# Patient Record
Sex: MALE | Race: WHITE | NOT HISPANIC OR LATINO | Employment: STUDENT | URBAN - METROPOLITAN AREA
[De-identification: names, ages, dates, MRNs, and addresses within clinical notes are randomized per-mention and may not be internally consistent; named-entity substitution may affect disease eponyms.]

---

## 2017-12-29 PROBLEM — F90.2 ATTENTION DEFICIT HYPERACTIVITY DISORDER (ADHD), COMBINED TYPE: Status: ACTIVE | Noted: 2017-12-29

## 2023-07-17 ENCOUNTER — OFFICE VISIT (OUTPATIENT)
Dept: URGENT CARE | Facility: CLINIC | Age: 28
End: 2023-07-17
Payer: COMMERCIAL

## 2023-07-17 VITALS
OXYGEN SATURATION: 99 % | TEMPERATURE: 99 F | HEIGHT: 67 IN | SYSTOLIC BLOOD PRESSURE: 122 MMHG | DIASTOLIC BLOOD PRESSURE: 75 MMHG | HEART RATE: 71 BPM | WEIGHT: 170 LBS | BODY MASS INDEX: 26.68 KG/M2 | RESPIRATION RATE: 16 BRPM

## 2023-07-17 DIAGNOSIS — R11.2 NAUSEA VOMITING AND DIARRHEA: Primary | ICD-10-CM

## 2023-07-17 DIAGNOSIS — R19.7 NAUSEA VOMITING AND DIARRHEA: Primary | ICD-10-CM

## 2023-07-17 PROCEDURE — 99213 PR OFFICE/OUTPT VISIT, EST, LEVL III, 20-29 MIN: ICD-10-PCS | Mod: S$GLB,,, | Performed by: NURSE PRACTITIONER

## 2023-07-17 PROCEDURE — 99213 OFFICE O/P EST LOW 20 MIN: CPT | Mod: S$GLB,,, | Performed by: NURSE PRACTITIONER

## 2023-07-17 RX ORDER — ONDANSETRON 4 MG/1
4 TABLET, ORALLY DISINTEGRATING ORAL EVERY 8 HOURS PRN
Qty: 20 TABLET | Refills: 0 | Status: SHIPPED | OUTPATIENT
Start: 2023-07-17 | End: 2023-10-26

## 2023-07-17 NOTE — PROGRESS NOTES
"Subjective:      Patient ID: Jose Rafael Gavin is a 28 y.o. male.    Vitals:  height is 5' 7" (1.702 m) and weight is 77.1 kg (170 lb). His oral temperature is 98.6 °F (37 °C). His blood pressure is 122/75 and his pulse is 71. His respiration is 16 and oxygen saturation is 99%.     Chief Complaint: Emesis    Vomiting and diarrhea that began yesterday.  Patient has taken tums, with no relief.     Emesis   This is a new problem. The current episode started yesterday. Associated symptoms include diarrhea. Treatments tried: tums.     Gastrointestinal:  Positive for vomiting and diarrhea.    Objective:     Physical Exam   Cardiovascular: Normal heart sounds and normal pulses.   Pulmonary/Chest: Effort normal and breath sounds normal.   Abdominal: Normal appearance. He exhibits no distension and no mass. Soft. flat abdomen There is no abdominal tenderness. There is no rebound, no guarding, no left CVA tenderness and no right CVA tenderness. No hernia.   Neurological: He is alert.   Vitals reviewed.    Assessment:     1. Nausea vomiting and diarrhea        Plan:       Nausea vomiting and diarrhea    Other orders  -     ondansetron (ZOFRAN-ODT) 4 MG TbDL; Take 1 tablet (4 mg total) by mouth every 8 (eight) hours as needed (nausea).  Dispense: 20 tablet; Refill: 0                  Suspect viral gastroenteritis.  Physical exam in [facility] without red flags.  Zofran as above.    Patient Instructions   Bennie Mantilla,     Hydration is most important. Drink water and electrolyte drinks. Avoid anything greasy or spicy for 1-2 weeks. Ok to take imodium for 3 loose stools in a 24 hour period. If you have fever, chills, body aches, cough, sore throat, or other symptoms you need to be seen again.    "

## 2023-07-17 NOTE — LETTER
July 17, 2023      [facility]  [address] [address] [address]       Patient: Jose Rafael Gavin   YOB: 1995  Date of Visit: 07/17/2023    To Whom It May Concern:    Jada Gavin  was at [facility] on 07/17/2023. The patient may return to work/school on 7/19/2023 with no restrictions. If you have any questions or concerns, or if I can be of further assistance, please do not hesitate to contact me.    Sincerely,    Kevin Wilkins, NP

## 2023-07-17 NOTE — PATIENT INSTRUCTIONS
Bennie Mantilla,     Hydration is most important. Drink water and electrolyte drinks. Avoid anything greasy or spicy for 1-2 weeks. Ok to take imodium for 3 loose stools in a 24 hour period. If you have fever, chills, body aches, cough, sore throat, or other symptoms you need to be seen again.

## 2023-08-17 ENCOUNTER — OFFICE VISIT (OUTPATIENT)
Dept: FAMILY MEDICINE | Facility: CLINIC | Age: 28
End: 2023-08-17
Payer: COMMERCIAL

## 2023-08-17 VITALS
HEIGHT: 67 IN | SYSTOLIC BLOOD PRESSURE: 124 MMHG | HEART RATE: 68 BPM | DIASTOLIC BLOOD PRESSURE: 82 MMHG | WEIGHT: 175.5 LBS | OXYGEN SATURATION: 98 % | TEMPERATURE: 98 F | BODY MASS INDEX: 27.55 KG/M2

## 2023-08-17 DIAGNOSIS — F90.2 ATTENTION DEFICIT HYPERACTIVITY DISORDER (ADHD), COMBINED TYPE: Primary | ICD-10-CM

## 2023-08-17 PROCEDURE — 99203 OFFICE O/P NEW LOW 30 MIN: CPT | Mod: S$GLB,,, | Performed by: FAMILY MEDICINE

## 2023-08-17 PROCEDURE — 99203 PR OFFICE/OUTPT VISIT, NEW, LEVL III, 30-44 MIN: ICD-10-PCS | Mod: S$GLB,,, | Performed by: FAMILY MEDICINE

## 2023-08-17 RX ORDER — DEXTROAMPHETAMINE SACCHARATE, AMPHETAMINE ASPARTATE, DEXTROAMPHETAMINE SULFATE AND AMPHETAMINE SULFATE 3.75; 3.75; 3.75; 3.75 MG/1; MG/1; MG/1; MG/1
TABLET ORAL
COMMUNITY
End: 2023-08-17 | Stop reason: SDUPTHER

## 2023-08-17 RX ORDER — DEXTROAMPHETAMINE SACCHARATE, AMPHETAMINE ASPARTATE, DEXTROAMPHETAMINE SULFATE AND AMPHETAMINE SULFATE 3.75; 3.75; 3.75; 3.75 MG/1; MG/1; MG/1; MG/1
15 TABLET ORAL 3 TIMES DAILY
Qty: 90 TABLET | Refills: 0 | Status: SHIPPED | OUTPATIENT
Start: 2023-09-16 | End: 2023-11-22 | Stop reason: SDUPTHER

## 2023-08-17 RX ORDER — DEXTROAMPHETAMINE SACCHARATE, AMPHETAMINE ASPARTATE, DEXTROAMPHETAMINE SULFATE AND AMPHETAMINE SULFATE 3.75; 3.75; 3.75; 3.75 MG/1; MG/1; MG/1; MG/1
TABLET ORAL
Qty: 90 TABLET | Refills: 0 | Status: SHIPPED | OUTPATIENT
Start: 2023-08-17 | End: 2023-10-23 | Stop reason: SDUPTHER

## 2023-08-17 RX ORDER — DEXTROAMPHETAMINE SACCHARATE, AMPHETAMINE ASPARTATE, DEXTROAMPHETAMINE SULFATE AND AMPHETAMINE SULFATE 3.75; 3.75; 3.75; 3.75 MG/1; MG/1; MG/1; MG/1
15 TABLET ORAL 3 TIMES DAILY
Qty: 90 TABLET | Refills: 0 | Status: SHIPPED | OUTPATIENT
Start: 2023-10-16 | End: 2023-11-22 | Stop reason: SDUPTHER

## 2023-08-20 NOTE — PROGRESS NOTES
Subjective:       Patient ID: Jose Rafael Gavin is a 28 y.o. male.    Chief Complaint: Medication Refill    Has not been in for couple of years.  Completed [place].  The went to [employer] at [employer].  Has been working [employer] now for 16 months..  However he is forgetful.  To resume his ADD medicine.  Or shift for Monday through week 2 days next.  Twelve.  Days.  Having little trouble staying top the symptoms.  Vascular chest palpitations.      Physical examination.  Vital signs noted.  No acute distress.  Chest clear.  Heart regular rate and.  Rhythm.        Objective:        Assessment:       1. Attention deficit hyperactivity disorder (ADHD), combined type        Plan:       Attention deficit hyperactivity disorder (ADHD), combined type  -     dextroamphetamine-amphetamine (ADDERALL) 15 mg tablet; TK 1 T PO TID  Dispense: 90 tablet; Refill: 0  -     dextroamphetamine-amphetamine (ADDERALL) 15 mg tablet; Take 1 tablet (15 mg total) by mouth 3 (three) times daily.  Dispense: 90 tablet; Refill: 0  -     dextroamphetamine-amphetamine (ADDERALL) 15 mg tablet; Take 1 tablet (15 mg total) by mouth 3 (three) times daily.  Dispense: 90 tablet; Refill: 0      Resume Adderall 15 mg t.i.d. 3 prescriptions for 90 each.  Follow-up here in 3 months.

## 2023-09-19 ENCOUNTER — TELEPHONE (OUTPATIENT)
Dept: FAMILY MEDICINE | Facility: CLINIC | Age: 28
End: 2023-09-19
Payer: COMMERCIAL

## 2023-09-19 NOTE — TELEPHONE ENCOUNTER
Told called [facility] to see in stock, he called back its not. Done     ----- Message from Anjel Bates sent at 9/19/2023  1:40 PM CDT -----  Type:  RX Refill Request    Who Called:  Patient  Refill or New Rx:  Refill  RX Name and Strength:  dextroamphetamine-amphetamine (ADDERALL) 15 mg tablet      How is the patient currently taking it? (ex. 1XDay):  1 tablet 3XDay  Is this a 30 day or 90 day RX:  30  Preferred Pharmacy with phone number:        [id number] - [place] - [address]  [address]  [place] [address]  Phone: [phone] Fax: [phone]    Local or Mail Order:  Local  Ordering Provider:  Hudson Lewis Call Back Number:  [phone]  Additional Information:

## 2023-09-24 ENCOUNTER — OFFICE VISIT (OUTPATIENT)
Dept: URGENT CARE | Facility: CLINIC | Age: 28
End: 2023-09-24
Payer: COMMERCIAL

## 2023-09-24 VITALS
HEART RATE: 87 BPM | TEMPERATURE: 98 F | HEIGHT: 67 IN | DIASTOLIC BLOOD PRESSURE: 88 MMHG | WEIGHT: 175 LBS | OXYGEN SATURATION: 97 % | BODY MASS INDEX: 27.47 KG/M2 | SYSTOLIC BLOOD PRESSURE: 134 MMHG | RESPIRATION RATE: 18 BRPM

## 2023-09-24 DIAGNOSIS — T14.8XXA WOUND INFECTION: Primary | ICD-10-CM

## 2023-09-24 DIAGNOSIS — L08.9 WOUND INFECTION: Primary | ICD-10-CM

## 2023-09-24 DIAGNOSIS — I89.1 LYMPHANGITIS: ICD-10-CM

## 2023-09-24 PROCEDURE — 99203 PR OFFICE/OUTPT VISIT, NEW, LEVL III, 30-44 MIN: ICD-10-PCS | Mod: S$GLB,,, | Performed by: NURSE PRACTITIONER

## 2023-09-24 PROCEDURE — 99203 OFFICE O/P NEW LOW 30 MIN: CPT | Mod: S$GLB,,, | Performed by: NURSE PRACTITIONER

## 2023-09-24 RX ORDER — MUPIROCIN 20 MG/G
OINTMENT TOPICAL 3 TIMES DAILY
Qty: 30 G | Refills: 1 | Status: SHIPPED | OUTPATIENT
Start: 2023-09-24 | End: 2023-10-26

## 2023-09-24 RX ORDER — CIPROFLOXACIN 750 MG/1
750 TABLET, FILM COATED ORAL EVERY 12 HOURS
Qty: 20 TABLET | Refills: 0 | Status: SHIPPED | OUTPATIENT
Start: 2023-09-24 | End: 2023-10-04

## 2023-09-24 NOTE — PROGRESS NOTES
"Subjective:      Patient ID: Jose Rafael Gavin is a 28 y.o. male.    Vitals:  height is 5' 7" (1.702 m) and weight is 79.4 kg (175 lb). His oral temperature is 98.2 °F (36.8 °C). His blood pressure is 134/88 and his pulse is 87. His respiration is 18 and oxygen saturation is 97%.     Chief Complaint: Arm Injury    Pt states that his symptoms started 2 days ago when he fell on a tool box and cut his left wrist/arm on the tool box. Patient states that his symptoms currently are swelling to the left wrist and redness possible infection, patient states that the pain level is a 5.      Last tetanus shot 08/2018.   Patient reports that the injury initially occurred Friday night.  However they went fishing yesterday and he did submerge that injured area under the brackish water with new symptoms emerging that include pyuria looking drainage, redness, pain and red streak going up      Constitution: Negative for chills and fever.   Gastrointestinal:  Negative for nausea, vomiting and diarrhea.   Musculoskeletal:  Positive for pain and trauma.   Skin:  Positive for wound, abrasion and erythema (Red streak from wound at wrist going up forearm).        Right inner wrist      Objective:     Physical Exam   Constitutional: He is oriented to person, place, and time.  Non-toxic appearance. He does not appear ill. No distress.   HENT:   Head: Normocephalic and atraumatic.   Nose: Nose normal.   Mouth/Throat: Mucous membranes are moist.   Eyes: Conjunctivae are normal.   Cardiovascular: Normal rate.   Pulmonary/Chest: Effort normal. No respiratory distress.   Abdominal: Normal appearance.   Musculoskeletal: Normal range of motion.         General: Signs of injury present. No swelling, tenderness or deformity. Normal range of motion.   Neurological: no focal deficit. He is alert and oriented to person, place, and time.   Skin: Skin is warm, dry and no rash. Capillary refill takes 2 to 3 seconds. erythema (Red streak from wound at wrist " going up forearm) and lesion No bruising         Comments: Mild erythema surrounding the skin abrasion right wrist with healthy granulation tissue visible however there is red streak ascending from wound up forearm near AC joint.  See attached photo   Psychiatric: His behavior is normal. Mood normal.   Nursing note and vitals reviewed.          Assessment:     1. Wound infection    2. Lymphangitis      Saltwater exposure    Plan:       Wound infection  -     ciprofloxacin HCl (CIPRO) 750 MG tablet; Take 1 tablet (750 mg total) by mouth every 12 (twelve) hours. for 10 days  Dispense: 20 tablet; Refill: 0  -     mupirocin (BACTROBAN) 2 % ointment; Apply topically 3 (three) times daily.  Dispense: 30 g; Refill: 1    Lymphangitis  -     ciprofloxacin HCl (CIPRO) 750 MG tablet; Take 1 tablet (750 mg total) by mouth every 12 (twelve) hours. for 10 days  Dispense: 20 tablet; Refill: 0

## 2023-09-24 NOTE — PATIENT INSTRUCTIONS
Clean the affected area at least twice a day with antibacterial soap and water.  Allowed to air dry well then apply the mupirocin ointment.    Start the ciprofloxacin as soon as received from the pharmacy and take twice a day for 10 days.    As we discussed any significant worsening of symptoms or onset of systemic symptoms such as fever/chills/vomiting/diarrhea please go to the emergency room promptly for further evaluation and treatment.

## 2023-10-23 DIAGNOSIS — F90.2 ATTENTION DEFICIT HYPERACTIVITY DISORDER (ADHD), COMBINED TYPE: ICD-10-CM

## 2023-10-23 RX ORDER — DEXTROAMPHETAMINE SACCHARATE, AMPHETAMINE ASPARTATE, DEXTROAMPHETAMINE SULFATE AND AMPHETAMINE SULFATE 3.75; 3.75; 3.75; 3.75 MG/1; MG/1; MG/1; MG/1
TABLET ORAL
Qty: 90 TABLET | Refills: 0 | Status: SHIPPED | OUTPATIENT
Start: 2023-10-23 | End: 2023-11-22 | Stop reason: SDUPTHER

## 2023-10-23 NOTE — TELEPHONE ENCOUNTER
No care due was identified.  [facility] Embedded Care Due Messages. Reference number: [id number].   10/23/2023 1:43:14 PM CDT

## 2023-10-23 NOTE — TELEPHONE ENCOUNTER
----- Message from Cindy Gutierrez sent at 10/23/2023  4:57 PM CDT -----  Regarding: Sooner Appointment Request  Contact: YSABEL GARCIA [phone]  Type:  Sooner Appointment Request      Patient is requesting a sooner appointment.  Patient declined first available appointment listed below.  Patient will not accept being placed on the waitlist and is requesting a message be sent to doctor.      Name of Caller:  YSABEL GARCIA    When is the first available appointment?  11-24-23    Symptoms:  MILD HERNIA,  (R) SIDE PAIN,  & SWELLING OF THE TESTICLES     Best Call Back Number:  [phone]      Additional Information:  Patient is calling office to speak with nurse/MA to schedule sooner appointment. Complains of (R) side pain and swelling of the testicles.   Please call back and advise. Thanks

## 2023-10-26 ENCOUNTER — LAB VISIT (OUTPATIENT)
Dept: LAB | Facility: HOSPITAL | Age: 28
End: 2023-10-26
Attending: STUDENT IN AN ORGANIZED HEALTH CARE EDUCATION/TRAINING PROGRAM
Payer: COMMERCIAL

## 2023-10-26 ENCOUNTER — OFFICE VISIT (OUTPATIENT)
Dept: FAMILY MEDICINE | Facility: CLINIC | Age: 28
End: 2023-10-26
Payer: COMMERCIAL

## 2023-10-26 VITALS
DIASTOLIC BLOOD PRESSURE: 86 MMHG | SYSTOLIC BLOOD PRESSURE: 120 MMHG | TEMPERATURE: 98 F | OXYGEN SATURATION: 100 % | HEART RATE: 84 BPM | BODY MASS INDEX: 26.33 KG/M2 | HEIGHT: 67 IN | WEIGHT: 167.75 LBS

## 2023-10-26 DIAGNOSIS — N50.819 TESTICLE TENDERNESS: Primary | ICD-10-CM

## 2023-10-26 DIAGNOSIS — N50.819 TESTICLE TENDERNESS: ICD-10-CM

## 2023-10-26 DIAGNOSIS — R10.31 RIGHT LOWER QUADRANT ABDOMINAL PAIN: ICD-10-CM

## 2023-10-26 PROCEDURE — 99999 PR PBB SHADOW E&M-EST. PATIENT-LVL IV: ICD-10-PCS | Mod: PBBFAC,,, | Performed by: STUDENT IN AN ORGANIZED HEALTH CARE EDUCATION/TRAINING PROGRAM

## 2023-10-26 PROCEDURE — 99999 PR PBB SHADOW E&M-EST. PATIENT-LVL IV: CPT | Mod: PBBFAC,,, | Performed by: STUDENT IN AN ORGANIZED HEALTH CARE EDUCATION/TRAINING PROGRAM

## 2023-10-26 PROCEDURE — 99213 OFFICE O/P EST LOW 20 MIN: CPT | Mod: S$GLB,,, | Performed by: STUDENT IN AN ORGANIZED HEALTH CARE EDUCATION/TRAINING PROGRAM

## 2023-10-26 PROCEDURE — 99213 PR OFFICE/OUTPT VISIT, EST, LEVL III, 20-29 MIN: ICD-10-PCS | Mod: S$GLB,,, | Performed by: STUDENT IN AN ORGANIZED HEALTH CARE EDUCATION/TRAINING PROGRAM

## 2023-10-26 NOTE — PROGRESS NOTES
[facility] Note    Subjective:  Chief Complaint:   Chief Complaint   Patient presents with    Hernia       History of Present Illness:  Adiel is a pleasant intelligent patient who is here for hernia problem visit     Concern for hernia   Right side lower abdominal/groin area x 5 days   May have strained the muscles of the right abdomen, felt a pull   Constant, comes in waves throughout the day   +testicular tenderness , initially 'hard', denies swelling   New sexual partner 3 months ago   Denies f/c, n/v, cp, sob, changes in bowel habits, urinary symptoms, rashes    Screening  Health Maintenance         Date Due Completion Date    Hepatitis C Screening Never done ---    Pneumococcal Vaccines (Age 0-64) (1 - PCV) Never done ---    HIV Screening Never done ---    Influenza Vaccine (1) 09/01/2023 10/25/2019    COVID-19 Vaccine (3 - 2023-24 season) 09/01/2023 1/5/2022    TETANUS VACCINE 08/13/2028 8/13/2018          Immunization History   Administered Date(s) Administered    COVID-19, MRNA, LN-S, PF (Pfizer) (Purple Cap) 12/15/2021, 01/05/2022    DTP 1995, 1995, 02/06/1996, 01/21/1997    DTaP 08/18/2000    HIB 1995, 1995, 02/06/1996, 01/21/1997    HPV Quadrivalent 07/22/2014, 03/04/2015, 08/12/2015    Hepatitis A, Pediatric/Adolescent, 2 Dose 05/19/2010, 07/11/2011    Hepatitis B, Pediatric/Adolescent 1995, 1995, 02/06/1996    IPV 08/18/2000    Influenza - Quadrivalent 12/05/2016, 11/07/2017, 10/25/2019    Influenza - Quadrivalent - PF *Preferred* (6 months and older) 10/25/2019    MMR 01/21/1997, 08/18/2000    Meningococcal Conjugate (MCV4P) 05/19/2010, 07/11/2011    OPV 1995, 1995, 02/06/1996    Tdap 05/19/2010, 08/13/2018     The ASCVD Risk score (Frieda DK, et al., 2019) failed to calculate for the following reasons:    The 2019 ASCVD risk score is only valid for ages 40 to 79    Allergies:  Review of patient's allergies indicates:  No Known  Allergies    Home Medications:  Current Outpatient Medications on File Prior to Visit   Medication Sig    dextroamphetamine-amphetamine (ADDERALL) 15 mg tablet Take 1 tablet (15 mg total) by mouth 3 (three) times daily.    dextroamphetamine-amphetamine (ADDERALL) 15 mg tablet Take 1 tablet (15 mg total) by mouth 3 (three) times daily.    dextroamphetamine-amphetamine (ADDERALL) 15 mg tablet TK 1 T PO TID    [DISCONTINUED] mupirocin (BACTROBAN) 2 % ointment Apply topically 3 (three) times daily.    [DISCONTINUED] ondansetron (ZOFRAN-ODT) 4 MG TbDL Take 1 tablet (4 mg total) by mouth every 8 (eight) hours as needed (nausea). (Patient not taking: Reported on 9/24/2023)     No current facility-administered medications on file prior to visit.       Past Medical History:   Diagnosis Date    Glenoid labral tear     Left shoulder    Orthodontics     top and bottom     Past Surgical History:   Procedure Laterality Date    SHOULDER SURGERY       Family History   Problem Relation Age of Onset    Hypertension Mother     Stroke Maternal Grandmother     Stroke Maternal Grandfather     Collagen disease Neg Hx      Social History     Tobacco Use    Smoking status: Some Days     Types: Vaping with nicotine    Smokeless tobacco: Never    Tobacco comments:     mother smokes   Substance Use Topics    Alcohol use: No    Drug use: No            The patient's past medical history, surgical history, social history, family history, allergies and medications have been reviewed.    Review of Systems     10 point review of systems was conducted and only the pertinent positives and pertinent negatives are noted above in the HPI section.    Physical Examination  General appearance: alert, cooperative, no distress  HEENT: normocephalic, atraumatic, PERRLA  Neck: trachea midline, no LAD, no thyromegaly, no neck stiffness  Lungs: clear to auscultation, no wheezes, rales or rhonchi, symmetric air entry  Heart: normal rate, regular rhythm, normal S1,  "S2, no murmurs, rubs, clicks or gallops  Abdomen: soft, nondistended, no rigidity, rebound, or guarding. Right lower abd tenderness, no hernia appreciated.   Testicular exam unremarkable (chaperone present)   Skin: No rashes or abnormal skin lesions, no apparent jaundice or bruising  Extremities: Full ROM of all extremities, peripheral pulses normal, no unilateral leg swelling or calf tenderness   Neurological:alert, oriented, normal speech, no new focal findings or movement disorder noted from baseline      BP Readings from Last 3 Encounters:   10/26/23 120/86   09/24/23 134/88   08/17/23 124/82     Wt Readings from Last 3 Encounters:   10/26/23 76.1 kg (167 lb 12.3 oz)   09/24/23 79.4 kg (175 lb)   08/17/23 79.6 kg (175 lb 7.8 oz)     /86 (BP Location: Right arm, Patient Position: Sitting, BP Method: Large (Manual))   Pulse 84   Temp 98 °F (36.7 °C) (Oral)   Ht 5' 7" (1.702 m)   Wt 76.1 kg (167 lb 12.3 oz)   SpO2 100%   BMI 26.28 kg/m²       274}  Laboratory: I have reviewed old labs below:       274}    Lab Results   Component Value Date    WBC 6.2 07/03/2018    HGB 15.9 07/03/2018    HCT 45.5 07/03/2018    MCV 86.2 07/03/2018     (H) 07/03/2018     07/03/2018    K 4.5 07/03/2018    CL 98 07/03/2018    CALCIUM 10.1 07/03/2018    CO2 28 07/03/2018    GLU 91 07/03/2018    BUN 9 07/03/2018    CREATININE 0.90 07/03/2018    ANIONGAP 11 04/26/2013    PROT 7.4 07/03/2018    ALBUMIN 4.9 07/03/2018    BILITOT 0.5 07/03/2018    ALKPHOS 42 07/03/2018    ALT 19 07/03/2018    AST 19 07/03/2018    CHOL 251 (H) 07/03/2018    TRIG 209 (H) 07/03/2018    HDL 60 07/03/2018    LDLCALC 154 (H) 07/03/2018      Lab reviewed by me: Particular labs of significance that I will monitor, workup, or treat to improve are mentioned below in diagnostic impression remarks.    Imaging/EKG: I have reviewed the pertinent results and my findings are noted in remarks.     274}    CC:   Chief Complaint   Patient presents with "    Hernia           274}    Assessment/Plan  Jose Rafael Gavin is a 28 y.o. male who presents to clinic with:  1. Testicle tenderness    2. Right lower quadrant abdominal pain          274}  Diagnostic Impression Remarks       28 y.o. male who  has a past medical history of Glenoid labral tear and Orthodontics. presents to clinic today for abdominal and testicular pain     This is the extent of this pleasant patient's concerns at this present time. He did not feel chest pain upon exertion, dyspnea, nausea, vomiting, diaphoresis, or syncope. No pleuritic chest pain, unilateral leg swelling, calf tenderness, or calf pain. Negative for unintentional weight loss night sweats, hematuria, and fevers. Jose Rafael will return to clinic in a few months for further workup and reassessment or sooner as needed. He was instructed to call the clinic or go to the emergency department or urgent care immediately if his symptoms do not improve, worsens, or if any new symptoms develop. As we discussed that symptoms could worsen over the next 24 hours he was advised that if any increased swelling, pain, or numbness arise to go immediately to the ED. Patient knows to call any time if an emergency arises. Shared decision making occurred and he verbalized understanding in agreement with this plan. I discussed imaging findings, diagnosis, possibilities, treatment options, medications, risks, and benefits. He had many questions regarding the options and long-term effects. All questions were answered. He expressed understanding after counseling regarding the diagnosis and recommendations. He was capable and demonstrated competence with understanding of these options. Shared decision making was performed resulting in him choosing the current treatment plan. Patient handout was given with instructions and recommendations. Advised the patient that if they become pregnant to alert us immediately to assess for medication changes. Having a healthy  weight can decrease the risk of 13 cancers and is an important goal. I also discussed the importance of close follow up to discuss labs, change or modify his medications if needed, monitor side effects, and further evaluation of medical problems.     Additional workup planned: see labs ordered below.    See below for labs and meds ordered with associated diagnosis      1. Testicle tenderness  - Urinalysis, Reflex to Urine Culture Urine, Clean Catch; Future  - RPR; Future  - HIV 1/2 Ag/Ab (4th Gen); Future  - Hepatitis C Antibody; Future  - CBC Auto Differential; Future  - Comprehensive Metabolic Panel; Future  - C. trachomatis/N. gonorrhoeae by AMP DNA; Future  - US Scrotum And Testicles; Future    2. Right lower quadrant abdominal pain  - Urinalysis, Reflex to Urine Culture Urine, Clean Catch; Future  - US Abdomen Complete; Future      RTC PRN  Please follow with your PCP for routine healthcare maintenance     Jackie Ferrara MD, [facility]   Family Medicine Physician  10/26/2023

## 2023-10-28 ENCOUNTER — HOSPITAL ENCOUNTER (OUTPATIENT)
Dept: RADIOLOGY | Facility: HOSPITAL | Age: 28
Discharge: HOME | End: 2023-10-28
Attending: STUDENT IN AN ORGANIZED HEALTH CARE EDUCATION/TRAINING PROGRAM
Payer: COMMERCIAL

## 2023-10-28 DIAGNOSIS — R10.31 RIGHT LOWER QUADRANT ABDOMINAL PAIN: ICD-10-CM

## 2023-10-28 DIAGNOSIS — N50.819 TESTICLE TENDERNESS: ICD-10-CM

## 2023-10-28 PROCEDURE — 76870 US EXAM SCROTUM: CPT | Mod: TC

## 2023-10-28 PROCEDURE — 76700 US EXAM ABDOM COMPLETE: CPT | Mod: TC

## 2023-10-30 ENCOUNTER — TELEPHONE (OUTPATIENT)
Dept: FAMILY MEDICINE | Facility: CLINIC | Age: 28
End: 2023-10-30

## 2023-10-30 DIAGNOSIS — F90.2 ATTENTION DEFICIT HYPERACTIVITY DISORDER (ADHD), COMBINED TYPE: ICD-10-CM

## 2023-10-30 RX ORDER — DEXTROAMPHETAMINE SACCHARATE, AMPHETAMINE ASPARTATE, DEXTROAMPHETAMINE SULFATE AND AMPHETAMINE SULFATE 3.75; 3.75; 3.75; 3.75 MG/1; MG/1; MG/1; MG/1
15 TABLET ORAL 3 TIMES DAILY
Qty: 90 TABLET | Refills: 0 | Status: CANCELLED | OUTPATIENT
Start: 2023-10-30

## 2023-10-30 NOTE — TELEPHONE ENCOUNTER
No care due was identified.  [facility] Embedded Care Due Messages. Reference number: [id number].   10/30/2023 4:20:32 PM CDT

## 2023-10-30 NOTE — TELEPHONE ENCOUNTER
----- Message from Crissy Morrow sent at 10/30/2023  3:54 PM CDT -----  Contact: Patient  Type:  RX Refill Request    Who Called:   Patient  Refill or New Rx:  Refill    RX Name and Strength:  dextroamphetamine-amphetamine (ADDERALL) 15 mg tablet    Preferred Pharmacy with phone number:      [id number] - [place] - [address]  [facility] [place] [address]  Phone: [phone] Fax: [phone]    Local or Mail Order:  Local  Ordering Provider:  Dr Treviño    Would the patient rather a call back or a response via [facility]?   Call back  Best Call Back Number:  [phone]    Additional Information:   States pharmacist advised this dosage will not be available for another month but they have a higher dosage in stock - states he's not sure if ER is in stock or available - please call to advise - thank you

## 2023-11-22 ENCOUNTER — OFFICE VISIT (OUTPATIENT)
Dept: FAMILY MEDICINE | Facility: CLINIC | Age: 28
End: 2023-11-22
Payer: COMMERCIAL

## 2023-11-22 VITALS
TEMPERATURE: 98 F | HEART RATE: 69 BPM | OXYGEN SATURATION: 98 % | BODY MASS INDEX: 27.83 KG/M2 | DIASTOLIC BLOOD PRESSURE: 80 MMHG | HEIGHT: 67 IN | SYSTOLIC BLOOD PRESSURE: 124 MMHG | WEIGHT: 177.31 LBS

## 2023-11-22 DIAGNOSIS — F90.2 ATTENTION DEFICIT HYPERACTIVITY DISORDER (ADHD), COMBINED TYPE: ICD-10-CM

## 2023-11-22 PROCEDURE — 99213 OFFICE O/P EST LOW 20 MIN: CPT | Mod: S$GLB,,, | Performed by: FAMILY MEDICINE

## 2023-11-22 PROCEDURE — 99213 PR OFFICE/OUTPT VISIT, EST, LEVL III, 20-29 MIN: ICD-10-PCS | Mod: S$GLB,,, | Performed by: FAMILY MEDICINE

## 2023-11-22 RX ORDER — DEXTROAMPHETAMINE SACCHARATE, AMPHETAMINE ASPARTATE, DEXTROAMPHETAMINE SULFATE AND AMPHETAMINE SULFATE 3.75; 3.75; 3.75; 3.75 MG/1; MG/1; MG/1; MG/1
15 TABLET ORAL 3 TIMES DAILY
Qty: 90 TABLET | Refills: 0 | Status: SHIPPED | OUTPATIENT
Start: 2024-01-22 | End: 2024-03-13 | Stop reason: SDUPTHER

## 2023-11-22 RX ORDER — DEXTROAMPHETAMINE SACCHARATE, AMPHETAMINE ASPARTATE, DEXTROAMPHETAMINE SULFATE AND AMPHETAMINE SULFATE 3.75; 3.75; 3.75; 3.75 MG/1; MG/1; MG/1; MG/1
TABLET ORAL
Qty: 90 TABLET | Refills: 0 | Status: SHIPPED | OUTPATIENT
Start: 2023-12-22 | End: 2024-03-13 | Stop reason: SDUPTHER

## 2023-11-22 RX ORDER — DEXTROAMPHETAMINE SACCHARATE, AMPHETAMINE ASPARTATE, DEXTROAMPHETAMINE SULFATE AND AMPHETAMINE SULFATE 3.75; 3.75; 3.75; 3.75 MG/1; MG/1; MG/1; MG/1
15 TABLET ORAL 3 TIMES DAILY
Qty: 90 TABLET | Refills: 0 | Status: SHIPPED | OUTPATIENT
Start: 2023-11-22 | End: 2024-03-13 | Stop reason: SDUPTHER

## 2023-11-22 NOTE — PROGRESS NOTES
Subjective:       Patient ID: Jose Rafael Gavin is a 28 y.o. male.    Chief Complaint: Follow-up    HPI  Patient presents to clinic for follow up and medication refills. Generally feeling well. No complaints. Working at [employer]. Shoulder tear. Pain resolved. Abdominal pain last month. Went to . US negative for hernia. 4 mm nonobstructing left renal calculus. ADHD. Adderall 15 mg TID. Some trouble previously getting prescription from [employer]. Cardiovascular ok on medication. Denies chest pain or palpitations. Due for pneumonia. Due for influenza. Due for Covid booster.   Review of Systems   Respiratory: Negative.     Cardiovascular: Negative.  Negative for chest pain and palpitations.   Musculoskeletal: Negative.    Psychiatric/Behavioral: Negative.         Objective:      Physical Exam  Constitutional:       Appearance: Normal appearance.   Neck:      Vascular: No carotid bruit.   Cardiovascular:      Rate and Rhythm: Normal rate and regular rhythm.      Pulses: Normal pulses.      Heart sounds: Normal heart sounds.   Pulmonary:      Effort: Pulmonary effort is normal.      Breath sounds: Normal breath sounds.   Musculoskeletal:      Right lower leg: No edema.      Left lower leg: No edema.   Lymphadenopathy:      Cervical: No cervical adenopathy.   Skin:     General: Skin is warm and dry.   Neurological:      General: No focal deficit present.      Mental Status: He is alert and oriented to person, place, and time.   Psychiatric:         Mood and Affect: Mood normal.         Behavior: Behavior normal.         Assessment:       1. Attention deficit hyperactivity disorder (ADHD), combined type        Plan:       Attention deficit hyperactivity disorder (ADHD), combined type  -     dextroamphetamine-amphetamine (ADDERALL) 15 mg tablet; Take 1 tablet (15 mg total) by mouth 3 (three) times daily.  Dispense: 90 tablet; Refill: 0  -     dextroamphetamine-amphetamine (ADDERALL) 15 mg tablet; TK 1 T PO TID   Dispense: 90 tablet; Refill: 0  -     dextroamphetamine-amphetamine (ADDERALL) 15 mg tablet; Take 1 tablet (15 mg total) by mouth 3 (three) times daily.  Dispense: 90 tablet; Refill: 0      Three prescriptions for Adderall 15 mg 90 each 1 up to t.i.d..  Follow-up for refill when needed.   check done.

## 2024-01-13 PROCEDURE — 99284 EMERGENCY DEPT VISIT MOD MDM: CPT | Mod: 25

## 2024-01-13 PROCEDURE — 96374 THER/PROPH/DIAG INJ IV PUSH: CPT

## 2024-01-13 PROCEDURE — 23650 CLTX SHO DSLC W/MNPJ WO ANES: CPT | Mod: RT

## 2024-01-13 PROCEDURE — 96375 TX/PRO/DX INJ NEW DRUG ADDON: CPT

## 2024-01-14 ENCOUNTER — HOSPITAL ENCOUNTER (EMERGENCY)
Facility: HOSPITAL | Age: 29
Discharge: HOME | End: 2024-01-14
Attending: EMERGENCY MEDICINE
Payer: COMMERCIAL

## 2024-01-14 VITALS
TEMPERATURE: 99 F | BODY MASS INDEX: 27.72 KG/M2 | HEART RATE: 96 BPM | SYSTOLIC BLOOD PRESSURE: 136 MMHG | OXYGEN SATURATION: 96 % | RESPIRATION RATE: 16 BRPM | WEIGHT: 177 LBS | DIASTOLIC BLOOD PRESSURE: 86 MMHG

## 2024-01-14 DIAGNOSIS — S49.90XA SHOULDER INJURY: ICD-10-CM
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
## 2024-01-14 DIAGNOSIS — S49.91XA RIGHT SHOULDER INJURY: ICD-10-CM

## 2024-01-14 DIAGNOSIS — S43.004A DISLOCATION OF RIGHT SHOULDER JOINT, INITIAL ENCOUNTER: Primary | ICD-10-CM

## 2024-01-14 PROCEDURE — 63600175 PHARM REV CODE 636 W HCPCS

## 2024-01-14 PROCEDURE — 23650 CLTX SHO DSLC W/MNPJ WO ANES: CPT | Mod: RT

## 2024-01-14 RX ORDER — IBUPROFEN 600 MG/1
600 TABLET ORAL EVERY 6 HOURS PRN
Qty: 20 TABLET | Refills: 0 | Status: SHIPPED | OUTPATIENT
Start: 2024-01-14 | End: 2024-01-19

## 2024-01-14 RX ORDER — MORPHINE SULFATE 4 MG/ML
8 INJECTION, SOLUTION INTRAMUSCULAR; INTRAVENOUS
Status: COMPLETED | OUTPATIENT
Start: 2024-01-14 | End: 2024-01-14

## 2024-01-14 RX ORDER — KETOROLAC TROMETHAMINE 30 MG/ML
15 INJECTION, SOLUTION INTRAMUSCULAR; INTRAVENOUS
Status: COMPLETED | OUTPATIENT
Start: 2024-01-14 | End: 2024-01-14

## 2024-01-14 RX ADMIN — KETOROLAC TROMETHAMINE 15 MG: 30 INJECTION, SOLUTION INTRAMUSCULAR; INTRAVENOUS at 01:01

## 2024-01-14 RX ADMIN — MORPHINE SULFATE 8 MG: 4 INJECTION INTRAVENOUS at 01:01

## 2024-01-14 NOTE — ED PROVIDER NOTES
Encounter Date: 1/13/2024       History     Chief Complaint   Patient presents with    Fall     Fall onto R shoulder while bowling. States shoulder is out of socket     HPI    Patient is a 20-year-old male with a past medical history presenting due to right shoulder injury after a fall.  Patient has a history of shoulder dislocation, 3 times in the past, states that that happened today.  Patient was bowling without wearing bowling she was, and fell onto the right shoulder.  Denies head trauma or pain elsewhere.  Previously, patient has had success with self reduction, but attempts this evening were unsuccessful.  He arrives with his significant other.     Review of patient's allergies indicates:  No Known Allergies  Past Medical History:   Diagnosis Date    Glenoid labral tear     Left shoulder    Orthodontics     top and bottom     Past Surgical History:   Procedure Laterality Date    SHOULDER SURGERY       Family History   Problem Relation Age of Onset    Hypertension Mother     Stroke Maternal Grandmother     Stroke Maternal Grandfather     Collagen disease Neg Hx      Social History     Tobacco Use    Smoking status: Some Days     Types: Vaping with nicotine    Smokeless tobacco: Never    Tobacco comments:     mother smokes   Substance Use Topics    Alcohol use: No    Drug use: No     Physical Exam     Initial Vitals [01/14/24 0000]   BP Pulse Resp Temp SpO2   (!) 136/90 (!) 111 18 98.8 °F (37.1 °C) 96 %      MAP       --         Physical Exam    Constitutional: He appears well-developed and well-nourished. He is not diaphoretic. No distress.   HENT:   Head: Normocephalic and atraumatic.   Cardiovascular:  Regular rhythm and normal heart sounds.     Exam reveals no gallop and no friction rub.       No murmur heard.  tachycardic   Pulmonary/Chest: Breath sounds normal. No respiratory distress. He has no wheezes. He has no rhonchi. He has no rales.   Musculoskeletal:      Comments: Pain with motion of R shoulder,  R shoulder is favored     Neurological: He is alert. He has normal strength. No sensory deficit.   Neurovascularly intact distal to shoulder injury   Skin: Skin is warm and dry.   Psychiatric: He has a normal mood and affect. Thought content normal.         ED Course   Shoulder Reduction    Date/Time: 1/15/2024 2:24 AM    Performed by: Yeni Hernandez MD  Authorized by: Yeni Hernandez MD    Location procedure was performed:  [facility] EMERGENCY DEPARTMENT  Assisting Provider:  Radha Grace DO  Pre-operative diagnosis:  R shoulder dislocation  Injury:     Injury location:  Shoulder    Location details:  Right shoulder    Injury type:  Dislocation    Dislocation type: anterior      Chronicity:  Recurrent      Pre-procedure assessment:     Neurovascular status: Neurovascularly intact      Local anesthesia used?: No      Patient sedated?: No        Selections made in this section will also lock the Injury type section above.:     Manipulation performed?: Yes      Reduction method: FARES technique.    Reduction method: FARES technique.    Reduction method: FARES technique.    Reduction method: FARES technique.    Reduction method: FARES technique.    Reduction method: FARES technique.    Reduction successful?: Yes      Confirmation: Reduction confirmed by x-ray      Immobilization:  Sling    Significant surgical tasks conducted by the assistant(s):  Additional reduction attempts as specified below  Post-procedure assessment:     Neurovascular status: Neurovascularly intact      Patient tolerance:  Patient tolerated the procedure well with no immediate complications     Unsuccessful attempts including FARES, Park, and Kocher techniques    Labs Reviewed - No data to display       Imaging Results              X-Ray Shoulder Trauma Right (Final result)  Result time 01/14/24 02:00:34      Final result by Orlando Jones MD (01/14/24 02:00:34)                   Impression:      No acute fracture.    Good position and  alignment at the glenohumeral joint following closed reduction of prior dislocation.      Electronically signed by: Orlando Jones MD  Date:    01/14/2024  Time:    02:00               Narrative:    EXAMINATION:  XR SHOULDER TRAUMA 3 VIEW RIGHT    CLINICAL HISTORY:  Unspecified injury of shoulder and upper arm, unspecified arm, initial encounter    TECHNIQUE:  Three views of the right shoulder were performed.    COMPARISON:  None    FINDINGS:  Bones: No fracture.  No lytic or blastic lesion.    Joints: No evidence for glenohumeral dislocation.  Acromioclavicular joint is unremarkable.    Soft tissues: Unremarkable.                                       X-Ray Shoulder Trauma Right (Final result)  Result time 01/14/24 01:59:36      Final result by Orlando Jones MD (01/14/24 01:59:36)                   Impression:      No acute fracture.    Anteroinferior glenohumeral dislocation.      Electronically signed by: Orlando Jones MD  Date:    01/14/2024  Time:    01:59               Narrative:    EXAMINATION:  XR SHOULDER TRAUMA 3 VIEW RIGHT    CLINICAL HISTORY:  Unspecified fall, initial encounter    TECHNIQUE:  Three views of the right shoulder were performed.    COMPARISON:  None    FINDINGS:  Bones: No fracture.  No lytic or blastic lesion.    Joints: Anteroinferior glenohumeral dislocation.  Acromioclavicular joint is unremarkable.    Soft tissues: Unremarkable.                                       Medications   morphine injection 8 mg (8 mg Intravenous Given 1/14/24 0104)   ketorolac injection 15 mg (15 mg Intravenous Given 1/14/24 0104)     Medical Decision Making  Patient is a 27yo male with hx of shoulder dislocation presenting to the ED due to R shoulder injury after fall. Patient reporting that attempts prior to hospital to reduce were unsuccessful. He was given 8mg morphine and toradol for pain control. Shoulder imaging revealed dislocation. Patient with successful reduction in the past without  requiring sedation. Patient was successfully reduced with FARES technique by Dr. Hernandez after unsuccessful Kocher, Park, and WENDY attempt. He was neurovascularly intact distal to shoulder injury both before and after reduction. Post-reduction films confirm successful reduction with good placement and without acute fracture. Patient able to be safely discharged home with orthopedic referral.    Amount and/or Complexity of Data Reviewed  Radiology: ordered.    Risk  Prescription drug management.              Attending Attestation:   Physician Attestation Statement for Resident:  As the supervising MD   Physician Attestation Statement: I have personally seen and examined this patient.   I agree with the above history.  -:   As the supervising MD I agree with the above PE.     As the supervising MD I agree with the above treatment, course, plan, and disposition.   I was personally present during the critical portions of the procedure(s) performed by the resident and was immediately available in the ED to provide services and assistance as needed during the entire procedure.  I have reviewed and agree with the residents interpretation of the following: x-rays.  I have reviewed the following: old records at this facility.                                       Clinical Impression:  Final diagnoses:  [W19.XXXA] Fall  [S49.91XA] Right shoulder injury  [S43.004A] Dislocation of right shoulder joint, initial encounter (Primary)  [S49.90XA] Shoulder injury  [S49.90XA] Shoulder injury - Status post reduction          ED Disposition Condition    Discharge Stable          ED Prescriptions       Medication Sig Dispense Start Date End Date Auth. Provider    ibuprofen (ADVIL,MOTRIN) 600 MG tablet Take 1 tablet (600 mg total) by mouth every 6 (six) hours as needed for Pain. 20 tablet 1/14/2024 1/19/2024 Yeni Hernandez MD          Follow-up Information       Follow up With Specialties Details Why Contact Info Additional  Information    Aashish Treviño III, MD Family Medicine Schedule an appointment as soon as possible for a visit   [address]  [address]  [facility] [address]  [phone]       [facility]oe Ol[facility]o - Orthopedics [facility] Schedule an appointment as soon as possible for a visit   [address], [address]th [address]  [facility] [address] [phone] [facility] - Main Building, 5th Floor Please park in [facility] and take Atrium elevator             Radha Grace DO  Resident  01/14/24 0707       Yeni Hernandez MD  01/14/24 2108       Radha Grace DO  Resident  01/15/24 0226       Yeni Hernandez MD  01/15/24 0222

## 2024-01-14 NOTE — DISCHARGE INSTRUCTIONS
Diagnosis:  Shoulder dislocation    Please read the provided information about shoulder dislocations.  You have been provided with an immobilizer.  You have also been referred to orthopedic surgery.  They should call you to set up follow-up.  If they do not call you, please call them.      Treatments you had today:   Medications   sodium chloride 0.9% bolus 1,606 mL 1,606 mL (has no administration in time range)   morphine injection 8 mg (8 mg Intravenous Given 1/14/24 0104)   ketorolac injection 15 mg (15 mg Intravenous Given 1/14/24 0104)       Follow-Up Plan:  - Follow-up with primary care doctor within 3 - 5 days as needed    Return to the Emergency Department for symptoms including but not limited to: worsening symptoms, shortness of breath or chest pain, vomiting with inability to hold down fluids, fevers greater than 100.4°F, passing out/fainting/unconsciousness, or other concerning symptoms.

## 2024-01-22 ENCOUNTER — OFFICE VISIT (OUTPATIENT)
Dept: ORTHOPEDICS | Facility: CLINIC | Age: 29
End: 2024-01-22
Payer: COMMERCIAL

## 2024-01-22 ENCOUNTER — TELEPHONE (OUTPATIENT)
Dept: FAMILY MEDICINE | Facility: CLINIC | Age: 29
End: 2024-01-22
Payer: COMMERCIAL

## 2024-01-22 VITALS — HEIGHT: 67 IN | BODY MASS INDEX: 27.78 KG/M2 | WEIGHT: 177 LBS

## 2024-01-22 DIAGNOSIS — S43.004A DISLOCATION OF RIGHT SHOULDER JOINT, INITIAL ENCOUNTER: ICD-10-CM

## 2024-01-22 PROCEDURE — 99999 PR PBB SHADOW E&M-EST. PATIENT-LVL III: CPT | Mod: PBBFAC,,, | Performed by: ORTHOPAEDIC SURGERY

## 2024-01-22 PROCEDURE — 99204 OFFICE O/P NEW MOD 45 MIN: CPT | Mod: S$GLB,,, | Performed by: ORTHOPAEDIC SURGERY

## 2024-01-22 NOTE — PROGRESS NOTES
CC:  28-year-old male presents for evaluation of right shoulder pain.  The patient was bowling and fell on 01/14/2024.  He sustained a dislocation of his right shoulder that was reduced in the emergency room.  He presents today for follow up.  He states his pain has gotten better every day.  He has not really having any mechanical symptoms at this point.  He states his shoulder is just a little sore.    Past Medical History:   Diagnosis Date    Glenoid labral tear     Left shoulder    Orthodontics     top and bottom       Past Surgical History:   Procedure Laterality Date    SHOULDER SURGERY         Current Outpatient Medications on File Prior to Visit   Medication Sig Dispense Refill    dextroamphetamine-amphetamine (ADDERALL) 15 mg tablet Take 1 tablet (15 mg total) by mouth 3 (three) times daily. 90 tablet 0    dextroamphetamine-amphetamine (ADDERALL) 15 mg tablet TK 1 T PO TID 90 tablet 0    dextroamphetamine-amphetamine (ADDERALL) 15 mg tablet Take 1 tablet (15 mg total) by mouth 3 (three) times daily. 90 tablet 0     No current facility-administered medications on file prior to visit.       ROS:    Constitution: Denies chills, fever, and sweats.  HENT: Denies headaches or blurry vision.  Cardiovascular: Denies chest pain or irregular heart beat.  Respiratory: Denies cough or shortness of breath.  Gastrointestinal: Denies abdominal pain, nausea, or vomiting.  Genitourinary:  Denies urinary incontinence, bladder and kidney issues  Musculoskeletal:  Denies muscle cramps.  Neurological: Denies dizziness or focal weakness.  Psychiatric/Behavioral: Normal mental status.  Hematologic/Lymphatic: Denies bleeding problem or easy bruising/bleeding.  Skin: Denies rash or suspicious lesions.    Physical examination     Gen - No acute distress, well nourished, well groomed   Eyes - Extraoccular motions intact, pupils equally round and reactive to light and accommodation   ENT - normocephalic, atruamtic, oropharynx clear    Neck - Supple, no abnormal masses   Cardiovascular - regular rate and rhythm   Pulmonary - clear to auscultation bilaterally, no wheezes, ronchi, or rales   Abdomen - soft, non-tender, non-distended, positive bowel sounds   Psych - The patient is alert and oriented x3 with normal mood and affect    Right Upper Extremity Examination     Skin is intact throughout   Motor is intact distally radial, median, ulnar, AIN, PIN   +2 radial and ulnar pulses   Sensation to light touch is intact distally radial, median, and ulnar     Examination of the Right shoulder:   ROM:   For - 150   Abd - 150   Ext - 50 with pain at end range of motion  Int - T12     Tenderness to palpation:   Subacromial space - negative  Biceps Tendon - negative  Anterior Glenohumeral Joint - positive  AC joint - negative  Glenohumeral instability - negative  Empty Can test - negative  Speeds test - negative  Schmidt/Neers sign - negative  Cross-arm adduction test - negative    X-ray images were examined and personally interpreted by me.  Views of the right shoulder dated 01/14/2024 were reviewed.  The pre and postreduction films.  On the pre reduction there is an obvious anterior inferior dislocation of the glenohumeral joint.  Postreduction films show a reduced glenohumeral joint.    Dx:  Right shoulder dislocation, stable    Plan:  We discussed a home exercise program and slowly increasing activity with specific exercises.  The patient verbalized understanding.  I am going to have him follow up in 6 weeks.  If he is still symptomatic at that point we may consider an MRI but there is a good chance he will be completely healed by then.

## 2024-01-23 NOTE — TELEPHONE ENCOUNTER
----- Message from Anjel Bates sent at 1/22/2024  2:06 PM CST -----  Type:  RX Refill Request    Who Called:  Patient  Refill or New Rx:  Refill  RX Name and Strength:  dextroamphetamine-amphetamine (ADDERALL) 15 mg tablet  How is the patient currently taking it? (ex. 1XDay):  1 tablet 3XDay  Is this a 30 day or 90 day RX:  90 Tablets  Preferred Pharmacy with phone number:        [facility] #[id number] - [address] AT [facility] [address]  [address]  [place] [address]  Phone: [phone] Fax: [phone]      Local or Mail Order:  Local  Ordering Provider:  Hudson Lewis Call Back Number:  [phone]  Additional Information:

## 2024-03-13 ENCOUNTER — OFFICE VISIT (OUTPATIENT)
Dept: FAMILY MEDICINE | Facility: CLINIC | Age: 29
End: 2024-03-13
Payer: COMMERCIAL

## 2024-03-13 ENCOUNTER — HOSPITAL ENCOUNTER (OUTPATIENT)
Dept: RADIOLOGY | Facility: HOSPITAL | Age: 29
Discharge: HOME | End: 2024-03-13
Attending: FAMILY MEDICINE
Payer: COMMERCIAL

## 2024-03-13 VITALS
BODY MASS INDEX: 27.53 KG/M2 | HEART RATE: 64 BPM | SYSTOLIC BLOOD PRESSURE: 120 MMHG | WEIGHT: 175.38 LBS | OXYGEN SATURATION: 98 % | TEMPERATURE: 98 F | HEIGHT: 67 IN | DIASTOLIC BLOOD PRESSURE: 86 MMHG

## 2024-03-13 DIAGNOSIS — F90.2 ATTENTION DEFICIT HYPERACTIVITY DISORDER (ADHD), COMBINED TYPE: ICD-10-CM

## 2024-03-13 DIAGNOSIS — R07.9 CHEST PAIN, UNSPECIFIED TYPE: ICD-10-CM

## 2024-03-13 DIAGNOSIS — R07.9 CHEST PAIN, UNSPECIFIED TYPE: Primary | ICD-10-CM

## 2024-03-13 PROCEDURE — 99999 PR PBB SHADOW E&M-EST. PATIENT-LVL IV: CPT | Mod: PBBFAC,,, | Performed by: FAMILY MEDICINE

## 2024-03-13 PROCEDURE — 71046 X-RAY EXAM CHEST 2 VIEWS: CPT | Mod: TC

## 2024-03-13 PROCEDURE — 99213 OFFICE O/P EST LOW 20 MIN: CPT | Mod: S$GLB,,, | Performed by: FAMILY MEDICINE

## 2024-03-13 RX ORDER — DEXTROAMPHETAMINE SACCHARATE, AMPHETAMINE ASPARTATE, DEXTROAMPHETAMINE SULFATE AND AMPHETAMINE SULFATE 3.75; 3.75; 3.75; 3.75 MG/1; MG/1; MG/1; MG/1
15 TABLET ORAL 3 TIMES DAILY
Qty: 90 TABLET | Refills: 0 | Status: SHIPPED | OUTPATIENT
Start: 2024-05-12

## 2024-03-13 RX ORDER — DEXTROAMPHETAMINE SACCHARATE, AMPHETAMINE ASPARTATE, DEXTROAMPHETAMINE SULFATE AND AMPHETAMINE SULFATE 3.75; 3.75; 3.75; 3.75 MG/1; MG/1; MG/1; MG/1
TABLET ORAL
Qty: 90 TABLET | Refills: 0 | Status: SHIPPED | OUTPATIENT
Start: 2024-04-12

## 2024-03-13 RX ORDER — DEXTROAMPHETAMINE SACCHARATE, AMPHETAMINE ASPARTATE, DEXTROAMPHETAMINE SULFATE AND AMPHETAMINE SULFATE 3.75; 3.75; 3.75; 3.75 MG/1; MG/1; MG/1; MG/1
15 TABLET ORAL 3 TIMES DAILY
Qty: 90 TABLET | Refills: 0 | Status: SHIPPED | OUTPATIENT
Start: 2024-03-13

## 2024-03-13 NOTE — PROGRESS NOTES
Subjective:       Patient ID: Jose Rafael Gavin is a 28 y.o. male.    Chief Complaint: Follow-up (Refill adderall also chest hurts months works out)    Cardiovascular no chest pain no palpitations no PND orthopnea.  Nothing that sounds cardiac anyway.  ADD doing well on his medication needs refill.  Uses 15 mg t.i.d. every day.  Having what sounds like some musculoskeletal chest pain however.  Left anterior chest near the sternum.  No injury.  Does work out regularly.  It has been present for few months but increase the past week.  Sore to the touch.  Hurts when he 1st gets out of bed in the morning then loosens up.  Lifts weights regularly.      Physical examination.  Vital signs noted.  Neck without bruit no adenopathy.  Good range of motion.  No pain.  Chest clear.  Heart regular rate rhythm.  Slightly tender left upper sternal area.  Increased by rotation of the chest.  Not increased by arm movement.        Objective:        Assessment:       1. Chest pain, unspecified type    2. Attention deficit hyperactivity disorder (ADHD), combined type        Plan:       Chest pain, unspecified type  -     X-Ray Chest PA And Lateral; Future; Expected date: 03/13/2024    Attention deficit hyperactivity disorder (ADHD), combined type  -     dextroamphetamine-amphetamine (ADDERALL) 15 mg tablet; Take 1 tablet (15 mg total) by mouth 3 (three) times daily.  Dispense: 90 tablet; Refill: 0  -     dextroamphetamine-amphetamine (ADDERALL) 15 mg tablet; TK 1 T PO TID  Dispense: 90 tablet; Refill: 0  -     dextroamphetamine-amphetamine (ADDERALL) 15 mg tablet; Take 1 tablet (15 mg total) by mouth 3 (three) times daily.  Dispense: 90 tablet; Refill: 0    Seems to be musculoskeletal pain of the chest.  Will get a chest x-ray.  Continue his Adderall.  Three prescriptions for 90 each.  15 mg t.i.d..  Follow-up in 3 months.

## 2024-04-12 ENCOUNTER — TELEPHONE (OUTPATIENT)
Dept: FAMILY MEDICINE | Facility: CLINIC | Age: 29
End: 2024-04-12
Payer: COMMERCIAL

## 2024-04-15 ENCOUNTER — TELEPHONE (OUTPATIENT)
Dept: FAMILY MEDICINE | Facility: CLINIC | Age: 29
End: 2024-04-15
Payer: COMMERCIAL

## 2024-04-15 NOTE — TELEPHONE ENCOUNTER
----- Message from Dory Bernabe sent at 4/15/2024  2:11 PM CDT -----  Contact: self  Type:  RX Refill Request    Who Called: Pt  Refill or New Rx:Refill   RX Name and Strength: dextroamphetamine-amphetamine (ADDERALL) 15 mg tablet  How is the patient currently taking it? (ex. 1XDay): as directed  Is this a 30 day or 90 day RX: 30  Preferred Pharmacy with phone number:   [id number] - [address] [facility] [address]  [address] [address]  Phone: [phone] Fax: [phone]  Local or Mail Order: Local  Ordering Provider: Dr. Treviño  Would the patient rather a call back or a response via [facility]? Call  Best Call Back Number: [phone]  Pt called on 3/12 and was told to check w/Pharmacy, however, no Rx was available over the weekend. Please call to advise... Thank you

## 2024-05-20 DIAGNOSIS — F90.2 ATTENTION DEFICIT HYPERACTIVITY DISORDER (ADHD), COMBINED TYPE: ICD-10-CM

## 2024-05-20 RX ORDER — DEXTROAMPHETAMINE SACCHARATE, AMPHETAMINE ASPARTATE, DEXTROAMPHETAMINE SULFATE AND AMPHETAMINE SULFATE 3.75; 3.75; 3.75; 3.75 MG/1; MG/1; MG/1; MG/1
15 TABLET ORAL 3 TIMES DAILY
Qty: 90 TABLET | Refills: 0 | OUTPATIENT
Start: 2024-05-20

## 2024-05-20 NOTE — TELEPHONE ENCOUNTER
No care due was identified.  [facility] Embedded Care Due Messages. Reference number: [id number].   5/20/2024 4:39:13 PM CDT

## 2024-05-20 NOTE — TELEPHONE ENCOUNTER
----- Message from Jo Ann Smith sent at 5/20/2024  1:33 PM CDT -----  Type:  RX Refill Request    Who Called:  patient  Refill or New Rx:  refill  RX Name and Strength:  dextroamphetamine-amphetamine (ADDERALL) 15 mg tablet  How is the patient currently taking it? (ex. 1XDay):  as directed  Is this a 30 day or 90 day RX:  30  Preferred Pharmacy with phone number:    [facility] #[id number] - [address] AT [facility] [address]  [address]  [place] [address]  Phone: [phone] Fax: [phone]  Local or Mail Order:  local  Ordering Provider:  shara Lewis Call Back Number:  [phone]  Additional Information:

## 2024-07-26 ENCOUNTER — PATIENT OUTREACH (OUTPATIENT)
Dept: ADMINISTRATIVE | Facility: HOSPITAL | Age: 29
End: 2024-07-26
Payer: COMMERCIAL

## 2024-07-26 ENCOUNTER — PATIENT MESSAGE (OUTPATIENT)
Dept: ADMINISTRATIVE | Facility: HOSPITAL | Age: 29
End: 2024-07-26
Payer: COMMERCIAL

## 2024-07-26 NOTE — PROGRESS NOTES
Population Health Chart Review & Patient Outreach Details    Outreach Performed: YES Portal    Additional [facility] Health Notes:    Tobacco History needs to be reviewed with patient.        Smoking:   Never, Former, Every Day, Some Days, Light Smoker  Types: Cigarettes, Pipe, Cigars, Vaping with nicotine, Vaping without nicotine  Smokeless Tobacco: Never, Former, Current, Unknown    Tobacco History needs to be reviewed with patient.          Updates Requested / Reviewed:        Health Maintenance Topics Overdue:    Health Maintenance Due   Topic    Pneumococcal Vaccines (Age 0-64) (1 of 2 - PCV)    COVID-19 Vaccine (3 - 2023-24 season)         Health Maintenance Topic(s) Outreach Outcomes & Actions Taken:    Tobacco History - Outreach Outcomes & Actions Taken  : Other    msg

## 2024-08-01 ENCOUNTER — OFFICE VISIT (OUTPATIENT)
Dept: FAMILY MEDICINE | Facility: CLINIC | Age: 29
End: 2024-08-01
Payer: COMMERCIAL

## 2024-08-01 VITALS
DIASTOLIC BLOOD PRESSURE: 86 MMHG | WEIGHT: 179.25 LBS | BODY MASS INDEX: 28.13 KG/M2 | TEMPERATURE: 99 F | OXYGEN SATURATION: 100 % | SYSTOLIC BLOOD PRESSURE: 128 MMHG | HEIGHT: 67 IN | HEART RATE: 88 BPM

## 2024-08-01 DIAGNOSIS — F90.2 ATTENTION DEFICIT HYPERACTIVITY DISORDER (ADHD), COMBINED TYPE: ICD-10-CM

## 2024-08-01 PROCEDURE — 99999 PR PBB SHADOW E&M-EST. PATIENT-LVL III: CPT | Mod: PBBFAC,,, | Performed by: FAMILY MEDICINE

## 2024-08-01 PROCEDURE — 99213 OFFICE O/P EST LOW 20 MIN: CPT | Mod: S$GLB,,, | Performed by: FAMILY MEDICINE

## 2024-08-01 RX ORDER — DEXTROAMPHETAMINE SACCHARATE, AMPHETAMINE ASPARTATE, DEXTROAMPHETAMINE SULFATE AND AMPHETAMINE SULFATE 3.75; 3.75; 3.75; 3.75 MG/1; MG/1; MG/1; MG/1
15 TABLET ORAL 3 TIMES DAILY
Qty: 90 TABLET | Refills: 0 | Status: SHIPPED | OUTPATIENT
Start: 2024-10-01

## 2024-08-01 RX ORDER — DEXTROAMPHETAMINE SACCHARATE, AMPHETAMINE ASPARTATE, DEXTROAMPHETAMINE SULFATE AND AMPHETAMINE SULFATE 3.75; 3.75; 3.75; 3.75 MG/1; MG/1; MG/1; MG/1
TABLET ORAL
Qty: 90 TABLET | Refills: 0 | Status: SHIPPED | OUTPATIENT
Start: 2024-09-01

## 2024-08-01 RX ORDER — DEXTROAMPHETAMINE SACCHARATE, AMPHETAMINE ASPARTATE, DEXTROAMPHETAMINE SULFATE AND AMPHETAMINE SULFATE 3.75; 3.75; 3.75; 3.75 MG/1; MG/1; MG/1; MG/1
15 TABLET ORAL 3 TIMES DAILY
Qty: 90 TABLET | Refills: 0 | Status: SHIPPED | OUTPATIENT
Start: 2024-08-01

## 2024-11-26 ENCOUNTER — OFFICE VISIT (OUTPATIENT)
Dept: FAMILY MEDICINE | Facility: CLINIC | Age: 29
End: 2024-11-26
Payer: COMMERCIAL

## 2024-11-26 VITALS
BODY MASS INDEX: 28.81 KG/M2 | SYSTOLIC BLOOD PRESSURE: 112 MMHG | WEIGHT: 183.56 LBS | TEMPERATURE: 98 F | OXYGEN SATURATION: 97 % | HEIGHT: 67 IN | DIASTOLIC BLOOD PRESSURE: 78 MMHG | HEART RATE: 78 BPM

## 2024-11-26 DIAGNOSIS — F90.2 ATTENTION DEFICIT HYPERACTIVITY DISORDER (ADHD), COMBINED TYPE: Primary | ICD-10-CM

## 2024-11-26 PROCEDURE — 99213 OFFICE O/P EST LOW 20 MIN: CPT | Mod: S$GLB,,, | Performed by: FAMILY MEDICINE

## 2024-11-26 PROCEDURE — 99999 PR PBB SHADOW E&M-EST. PATIENT-LVL III: CPT | Mod: PBBFAC,,, | Performed by: FAMILY MEDICINE

## 2024-11-26 RX ORDER — DEXTROAMPHETAMINE SACCHARATE, AMPHETAMINE ASPARTATE, DEXTROAMPHETAMINE SULFATE AND AMPHETAMINE SULFATE 3.75; 3.75; 3.75; 3.75 MG/1; MG/1; MG/1; MG/1
15 TABLET ORAL 3 TIMES DAILY
Qty: 90 TABLET | Refills: 0 | Status: SHIPPED | OUTPATIENT
Start: 2024-11-26 | End: 2024-11-26 | Stop reason: SDUPTHER

## 2024-11-26 RX ORDER — DEXTROAMPHETAMINE SACCHARATE, AMPHETAMINE ASPARTATE, DEXTROAMPHETAMINE SULFATE AND AMPHETAMINE SULFATE 3.75; 3.75; 3.75; 3.75 MG/1; MG/1; MG/1; MG/1
TABLET ORAL
Qty: 90 TABLET | Refills: 0 | Status: SHIPPED | OUTPATIENT
Start: 2024-11-26 | End: 2024-11-26 | Stop reason: SDUPTHER

## 2024-11-26 NOTE — PROGRESS NOTES
SCRIBE #1 NOTE: I, Pravin Covarrubias, am scribing for, and in the presence of, Aashish Treviño III, MD. I have scribed the entire note.     Subjective:       Patient ID: Jose Rafael Gavin is a 29 y.o. male.    Chief Complaint: Follow-up (3 month)    Jose Rafael Gavin is a 29 y.o. male who presents for 3 month follow-up. Mr. Gavin was last seen 8/21/24 for a follow-up regarding an adderall refill. SLAP tear of shoulder. Doing okay. Works at [employer]. ADHD. Requests Adderall refill. Cardiovascular good. No chest pain. No palpitations. BMI is 28.75. Pneumococcal vaccine due. Patient declines Influenza vaccination. All other care gaps discussed.           Review of Systems   Constitutional:  Negative for chills and fever.   HENT:  Negative for congestion and sore throat.    Eyes:  Negative for pain and visual disturbance.   Respiratory:  Negative for chest tightness and shortness of breath.    Cardiovascular:  Negative for chest pain and palpitations.   Gastrointestinal:  Negative for nausea.   Endocrine: Negative for polydipsia and polyuria.   Genitourinary:  Negative for dysuria and flank pain.   Musculoskeletal:  Negative for back pain, neck pain and neck stiffness.   Skin:  Negative for rash.   Allergic/Immunologic: Negative for immunocompromised state.   Neurological:  Negative for dizziness and weakness.   Hematological:  Does not bruise/bleed easily.   Psychiatric/Behavioral:  Negative for agitation and behavioral problems.    All other systems reviewed and are negative.      Objective:      Physical examination: Vital signs noted. No acute distress. No carotid bruit. Regular heart rate and rhythm. Lungs clear to auscultation bilaterally. Abdomen bowel sounds are positive soft and nontender. Extremities without edema. 2+ pedal pulses.      Assessment:       1. Attention deficit hyperactivity disorder (ADHD), combined type    2. BMI 28.0-28.9,adult        Plan:       Attention deficit hyperactivity disorder (ADHD),  combined type  -     dextroamphetamine-amphetamine (ADDERALL) 15 mg tablet; TK 1 T PO TID  Dispense: 90 tablet; Refill: 0  -     dextroamphetamine-amphetamine (ADDERALL) 15 mg tablet; Take 1 tablet (15 mg total) by mouth 3 (three) times daily.  Dispense: 90 tablet; Refill: 0  -     dextroamphetamine-amphetamine (ADDERALL) 15 mg tablet; Take 1 tablet (15 mg total) by mouth 3 (three) times daily.  Dispense: 90 tablet; Refill: 0  -     Lipid Panel; Future; Expected date: 11/26/2024    BMI 28.0-28.9,adult    Doing well on current medication.  Refill the Adderall 15 mg t.i.d. 3 prescriptions for 90 each.  Follow-up virtual visit in 3 months.  Declines flu shot.  Needs lipid profile.   check done.    I, Dr Aashish Treviño, personally performed the services described in this documentation. All medical record entries made by the scribe were at my direction and in my presence. I have reviewed the chart and agree that the record reflects my personal performance and is accurate and complete.

## 2024-11-26 NOTE — PATIENT INSTRUCTIONS
SCRIBE #1 NOTE: I, Pravin Covarrubias, am scribing for, and in the presence of, Aashish Treviño III, MD. I have scribed the entire note.     Subjective:       Patient ID: Jose Rafael Gavin is a 29 y.o. male.    Chief Complaint: Follow-up (3 month)    [unfilled]      [unfilled][unfilled]   Objective:      Physical examination: Vital signs noted. No acute distress. No carotid bruit. Regular heart rate and rhythm. Lungs clear to auscultation bilaterally. Abdomen bowel sounds are positive soft and nontender. Extremities without edema. 2+ pedal pulses.  [unfilled]   Assessment:       1. Attention deficit hyperactivity disorder (ADHD), combined type    [unfilled]   Plan:       Attention deficit hyperactivity disorder (ADHD), combined type        I, Dr Aashish Treviño, personally performed the services described in this documentation. All medical record entries made by the scribe were at my direction and in my presence. I have reviewed the chart and agree that the record reflects my personal performance and is accurate and complete.

## 2024-11-29 RX ORDER — DEXTROAMPHETAMINE SACCHARATE, AMPHETAMINE ASPARTATE, DEXTROAMPHETAMINE SULFATE AND AMPHETAMINE SULFATE 3.75; 3.75; 3.75; 3.75 MG/1; MG/1; MG/1; MG/1
15 TABLET ORAL 3 TIMES DAILY
Qty: 90 TABLET | Refills: 0 | Status: SHIPPED | OUTPATIENT
Start: 2024-12-26

## 2024-11-29 RX ORDER — DEXTROAMPHETAMINE SACCHARATE, AMPHETAMINE ASPARTATE, DEXTROAMPHETAMINE SULFATE AND AMPHETAMINE SULFATE 3.75; 3.75; 3.75; 3.75 MG/1; MG/1; MG/1; MG/1
15 TABLET ORAL 3 TIMES DAILY
Qty: 90 TABLET | Refills: 0 | Status: SHIPPED | OUTPATIENT
Start: 2025-01-25

## 2024-11-29 RX ORDER — DEXTROAMPHETAMINE SACCHARATE, AMPHETAMINE ASPARTATE, DEXTROAMPHETAMINE SULFATE AND AMPHETAMINE SULFATE 3.75; 3.75; 3.75; 3.75 MG/1; MG/1; MG/1; MG/1
TABLET ORAL
Qty: 90 TABLET | Refills: 0 | Status: SHIPPED | OUTPATIENT
Start: 2024-11-29

## 2025-03-10 ENCOUNTER — OFFICE VISIT (OUTPATIENT)
Dept: FAMILY MEDICINE | Facility: CLINIC | Age: 30
End: 2025-03-10
Payer: COMMERCIAL

## 2025-03-10 VITALS
HEART RATE: 83 BPM | BODY MASS INDEX: 27.75 KG/M2 | WEIGHT: 176.81 LBS | TEMPERATURE: 99 F | SYSTOLIC BLOOD PRESSURE: 112 MMHG | DIASTOLIC BLOOD PRESSURE: 76 MMHG | OXYGEN SATURATION: 97 % | HEIGHT: 67 IN

## 2025-03-10 DIAGNOSIS — F90.2 ATTENTION DEFICIT HYPERACTIVITY DISORDER (ADHD), COMBINED TYPE: Primary | ICD-10-CM

## 2025-03-10 PROCEDURE — 99999 PR PBB SHADOW E&M-EST. PATIENT-LVL III: CPT | Mod: PBBFAC,,, | Performed by: FAMILY MEDICINE

## 2025-03-10 PROCEDURE — 99214 OFFICE O/P EST MOD 30 MIN: CPT | Mod: S$GLB,,, | Performed by: FAMILY MEDICINE

## 2025-03-10 RX ORDER — DEXTROAMPHETAMINE SACCHARATE, AMPHETAMINE ASPARTATE, DEXTROAMPHETAMINE SULFATE AND AMPHETAMINE SULFATE 3.75; 3.75; 3.75; 3.75 MG/1; MG/1; MG/1; MG/1
15 TABLET ORAL 3 TIMES DAILY
Qty: 90 TABLET | Refills: 0 | Status: SHIPPED | OUTPATIENT
Start: 2025-04-09

## 2025-03-10 RX ORDER — DEXTROAMPHETAMINE SACCHARATE, AMPHETAMINE ASPARTATE, DEXTROAMPHETAMINE SULFATE AND AMPHETAMINE SULFATE 3.75; 3.75; 3.75; 3.75 MG/1; MG/1; MG/1; MG/1
15 TABLET ORAL 3 TIMES DAILY
Qty: 90 TABLET | Refills: 0 | Status: SHIPPED | OUTPATIENT
Start: 2025-03-10

## 2025-03-10 RX ORDER — DEXTROAMPHETAMINE SACCHARATE, AMPHETAMINE ASPARTATE, DEXTROAMPHETAMINE SULFATE AND AMPHETAMINE SULFATE 3.75; 3.75; 3.75; 3.75 MG/1; MG/1; MG/1; MG/1
15 TABLET ORAL 3 TIMES DAILY
Qty: 90 TABLET | Refills: 0 | Status: SHIPPED | OUTPATIENT
Start: 2025-05-09

## 2025-03-12 NOTE — PROGRESS NOTES
Subjective:       Patient ID: Jose Rafael Gavin is a 29 y.o. male.    Chief Complaint: Follow-up (Medication refill)    BMI 27 down 7 lb from 184-177.  Working at [employer] met refined rate.  ADD doing well on medication.  Needs refill on 15 mg t.i.d..  No palpitations no chest pain.      Physical examination.  Vital signs noted.  No acute distress neck without bruit no adenopathy chest clear.  Heart regular rate and rhythm.  Extremities without edema positive pedal pulses.        Objective:        Assessment:       1. Attention deficit hyperactivity disorder (ADHD), combined type    2. BMI 27.0-27.9,adult        Plan:       Attention deficit hyperactivity disorder (ADHD), combined type  -     dextroamphetamine-amphetamine (ADDERALL) 15 mg tablet; Take 1 tablet (15 mg total) by mouth 3 (three) times daily.  Dispense: 90 tablet; Refill: 0  -     dextroamphetamine-amphetamine (ADDERALL) 15 mg tablet; Take 1 tablet (15 mg total) by mouth 3 (three) times daily.  Dispense: 90 tablet; Refill: 0  -     dextroamphetamine-amphetamine (ADDERALL) 15 mg tablet; Take 1 tablet (15 mg total) by mouth 3 (three) times daily.  Dispense: 90 tablet; Refill: 0    BMI 27.0-27.9,adult      Adderall 15 mg t.i.d. 3 prescriptions for 90 each.  Follow-up in a proximally 3 months.

## 2025-07-02 ENCOUNTER — LAB VISIT (OUTPATIENT)
Dept: LAB | Facility: HOSPITAL | Age: 30
End: 2025-07-02
Attending: FAMILY MEDICINE
Payer: COMMERCIAL

## 2025-07-02 ENCOUNTER — RESULTS FOLLOW-UP (OUTPATIENT)
Dept: FAMILY MEDICINE | Facility: CLINIC | Age: 30
End: 2025-07-02

## 2025-07-02 ENCOUNTER — OFFICE VISIT (OUTPATIENT)
Dept: FAMILY MEDICINE | Facility: CLINIC | Age: 30
End: 2025-07-02
Payer: COMMERCIAL

## 2025-07-02 VITALS
TEMPERATURE: 99 F | BODY MASS INDEX: 28.11 KG/M2 | HEART RATE: 92 BPM | DIASTOLIC BLOOD PRESSURE: 70 MMHG | HEIGHT: 67 IN | WEIGHT: 179.13 LBS | SYSTOLIC BLOOD PRESSURE: 114 MMHG | OXYGEN SATURATION: 98 %

## 2025-07-02 DIAGNOSIS — F90.2 ATTENTION DEFICIT HYPERACTIVITY DISORDER (ADHD), COMBINED TYPE: ICD-10-CM

## 2025-07-02 DIAGNOSIS — F90.2 ATTENTION DEFICIT HYPERACTIVITY DISORDER (ADHD), COMBINED TYPE: Primary | ICD-10-CM

## 2025-07-02 LAB
ABSOLUTE EOSINOPHIL (SMH): 0.2 K/UL
ABSOLUTE MONOCYTE (SMH): 0.31 K/UL (ref 0.3–1)
ABSOLUTE NEUTROPHIL COUNT (SMH): 2.6 K/UL (ref 1.8–7.7)
ALBUMIN SERPL-MCNC: 4.8 G/DL (ref 3.5–5.2)
ALP SERPL-CCNC: 39 UNIT/L (ref 55–135)
ALT SERPL-CCNC: 23 UNIT/L (ref 10–44)
ANION GAP (SMH): 7 MMOL/L (ref 8–16)
AST SERPL-CCNC: 22 UNIT/L (ref 10–40)
BASOPHILS # BLD AUTO: 0.02 K/UL
BASOPHILS NFR BLD AUTO: 0.4 %
BILIRUB SERPL-MCNC: 0.6 MG/DL (ref 0.1–1)
BUN SERPL-MCNC: 19 MG/DL (ref 6–20)
CALCIUM SERPL-MCNC: 9.6 MG/DL (ref 8.7–10.5)
CHLORIDE SERPL-SCNC: 104 MMOL/L (ref 95–110)
CHOLEST SERPL-MCNC: 280 MG/DL (ref 120–199)
CHOLEST/HDLC SERPL: 4.8 {RATIO} (ref 2–5)
CO2 SERPL-SCNC: 31 MMOL/L (ref 23–29)
CREAT SERPL-MCNC: 1.1 MG/DL (ref 0.5–1.4)
ERYTHROCYTE [DISTWIDTH] IN BLOOD BY AUTOMATED COUNT: 13 % (ref 11.5–14.5)
GFR SERPLBLD CREATININE-BSD FMLA CKD-EPI: >60 ML/MIN/1.73/M2
GLUCOSE SERPL-MCNC: 84 MG/DL (ref 70–110)
HCT VFR BLD AUTO: 42.7 % (ref 40–54)
HDLC SERPL-MCNC: 58 MG/DL (ref 40–75)
HDLC SERPL: 20.7 % (ref 20–50)
HGB BLD-MCNC: 14.7 GM/DL (ref 14–18)
IMM GRANULOCYTES # BLD AUTO: 0.02 K/UL (ref 0–0.04)
IMM GRANULOCYTES NFR BLD AUTO: 0.4 % (ref 0–0.5)
LDLC SERPL CALC-MCNC: 167.8 MG/DL (ref 63–159)
LYMPHOCYTES # BLD AUTO: 1.59 K/UL (ref 1–4.8)
MCH RBC QN AUTO: 30.1 PG (ref 27–31)
MCHC RBC AUTO-ENTMCNC: 34.4 G/DL (ref 32–36)
MCV RBC AUTO: 88 FL (ref 82–98)
NONHDLC SERPL-MCNC: 222 MG/DL
NUCLEATED RBC (/100WBC) (SMH): 0 /100 WBC
PLATELET # BLD AUTO: 342 K/UL (ref 150–450)
PMV BLD AUTO: 9 FL (ref 9.2–12.9)
POTASSIUM SERPL-SCNC: 4.5 MMOL/L (ref 3.5–5.1)
PROT SERPL-MCNC: 7.1 GM/DL (ref 6–8.4)
RBC # BLD AUTO: 4.88 M/UL (ref 4.6–6.2)
RELATIVE EOSINOPHIL (SMH): 4.2 % (ref 0–8)
RELATIVE LYMPHOCYTE (SMH): 33.5 % (ref 18–48)
RELATIVE MONOCYTE (SMH): 6.5 % (ref 4–15)
RELATIVE NEUTROPHIL (SMH): 55 % (ref 38–73)
SODIUM SERPL-SCNC: 142 MMOL/L (ref 136–145)
TRIGL SERPL-MCNC: 271 MG/DL (ref 30–150)
WBC # BLD AUTO: 4.74 K/UL (ref 3.9–12.7)

## 2025-07-02 PROCEDURE — 80061 LIPID PANEL: CPT

## 2025-07-02 PROCEDURE — 99999 PR PBB SHADOW E&M-EST. PATIENT-LVL III: CPT | Mod: PBBFAC,,, | Performed by: FAMILY MEDICINE

## 2025-07-02 PROCEDURE — 85025 COMPLETE CBC W/AUTO DIFF WBC: CPT

## 2025-07-02 PROCEDURE — 99213 OFFICE O/P EST LOW 20 MIN: CPT | Mod: S$GLB,,, | Performed by: FAMILY MEDICINE

## 2025-07-02 PROCEDURE — 36415 COLL VENOUS BLD VENIPUNCTURE: CPT

## 2025-07-02 PROCEDURE — 80053 COMPREHEN METABOLIC PANEL: CPT

## 2025-07-02 RX ORDER — DEXTROAMPHETAMINE SACCHARATE, AMPHETAMINE ASPARTATE, DEXTROAMPHETAMINE SULFATE AND AMPHETAMINE SULFATE 3.75; 3.75; 3.75; 3.75 MG/1; MG/1; MG/1; MG/1
15 TABLET ORAL 3 TIMES DAILY
Qty: 90 TABLET | Refills: 0 | Status: SHIPPED | OUTPATIENT
Start: 2025-07-07

## 2025-07-02 RX ORDER — DEXTROAMPHETAMINE SACCHARATE, AMPHETAMINE ASPARTATE, DEXTROAMPHETAMINE SULFATE AND AMPHETAMINE SULFATE 3.75; 3.75; 3.75; 3.75 MG/1; MG/1; MG/1; MG/1
15 TABLET ORAL 3 TIMES DAILY
Qty: 90 TABLET | Refills: 0 | Status: SHIPPED | OUTPATIENT
Start: 2025-09-05

## 2025-07-02 RX ORDER — DEXTROAMPHETAMINE SACCHARATE, AMPHETAMINE ASPARTATE, DEXTROAMPHETAMINE SULFATE AND AMPHETAMINE SULFATE 3.75; 3.75; 3.75; 3.75 MG/1; MG/1; MG/1; MG/1
15 TABLET ORAL 3 TIMES DAILY
Qty: 90 TABLET | Refills: 0 | Status: SHIPPED | OUTPATIENT
Start: 2025-08-06

## 2025-07-02 NOTE — PROGRESS NOTES
SCRIBE #1 NOTE: I, Jordan Petit, am scribing for, and in the presence of, Aashish Treviño III, MD. I have scribed the entire note.     Subjective:       Patient ID: Jose Rafael Gavin is a 29 y.o. male.    Chief Complaint: Follow-up (Medication refill)    Mr. Jose Rafael Gavin is here for a follow up after his last appointment on 3/10/2025. BMI of 28.05. Cardiovascular good. No chest pain. No palpitations. Blood pressure is 114/70. ADD ok. Taking 15 mg Adderall tid. The patient needs his medication refilled. He has about 15-20 pills left. CV ok.    Review of Systems   Constitutional:  Negative for chills and fever.   HENT:  Negative for congestion and sore throat.    Eyes:  Negative for pain and visual disturbance.   Respiratory:  Negative for chest tightness and shortness of breath.    Cardiovascular:  Negative for chest pain.   Gastrointestinal:  Negative for nausea.   Endocrine: Negative for polydipsia and polyuria.   Genitourinary:  Negative for dysuria and flank pain.   Musculoskeletal:  Negative for back pain, neck pain and neck stiffness.   Skin:  Negative for rash.   Allergic/Immunologic: Negative for immunocompromised state.   Neurological:  Negative for dizziness and weakness.   Hematological:  Does not bruise/bleed easily.   Psychiatric/Behavioral:  Negative for agitation and behavioral problems.    All other systems reviewed and are negative.      Objective:      Physical examination: Vital signs noted. No acute distress. No carotid bruit. Regular heart rate and rhythm. Lungs clear to auscultation bilaterally. Abdomen bowel sounds positive soft and nontender. Extremities without edema. 2+ pedal pulses.      Assessment:       1. Attention deficit hyperactivity disorder (ADHD), combined type    2. BMI 28.0-28.9,adult        Plan:       Attention deficit hyperactivity disorder (ADHD), combined type  -     dextroamphetamine-amphetamine (ADDERALL) 15 mg tablet; Take 1 tablet (15 mg total) by mouth 3 (three) times  daily.  Dispense: 90 tablet; Refill: 0  -     dextroamphetamine-amphetamine (ADDERALL) 15 mg tablet; Take 1 tablet (15 mg total) by mouth 3 (three) times daily.  Dispense: 90 tablet; Refill: 0  -     dextroamphetamine-amphetamine (ADDERALL) 15 mg tablet; Take 1 tablet (15 mg total) by mouth 3 (three) times daily.  Dispense: 90 tablet; Refill: 0  -     CBC Auto Differential; Future; Expected date: 07/16/2025  -     Comprehensive Metabolic Panel; Future; Expected date: 07/16/2025  -     Lipid Panel; Future; Expected date: 07/16/2025    BMI 28.0-28.9,adult    Refill Adderall 15 mg t.i.d. 3 prescriptions for 90 pills each.  One dated July 7th 1 dated August 6th and 1 dated September 5th.   check done.  Three-month follow-up.  Lipid CMP CBC today.  All care gaps addressed or discussed.     I, Aashish Treviño III, MD, personally performed the services described in this documentation. All medical record entries made by the scribe were at my direction and in my presence. I have reviewed the chart and agree that the record reflects my personal performance and is accurate and complete.

## 2025-07-03 ENCOUNTER — TELEPHONE (OUTPATIENT)
Dept: FAMILY MEDICINE | Facility: CLINIC | Age: 30
End: 2025-07-03
Payer: COMMERCIAL

## 2025-07-03 NOTE — TELEPHONE ENCOUNTER
----- Message from Aashish Treviño MD sent at 7/2/2025  9:39 PM CDT -----  ABNORMAL .  Cholesterol is very high.  Low-fat diet.  Be sure he is not using any kind of supplement that would raise cholesterol.  Repeat the number in 6 months.  ----- Message -----  From: Lab, Background User  Sent: 7/2/2025  12:10 PM CDT  To: Aashish Treviño III, MD

## 2025-07-21 ENCOUNTER — PATIENT MESSAGE (OUTPATIENT)
Dept: FAMILY MEDICINE | Facility: CLINIC | Age: 30
End: 2025-07-21
Payer: COMMERCIAL

## 2025-07-22 RX ORDER — VARENICLINE TARTRATE 0.5 (11)-1
KIT ORAL
Qty: 1 EACH | Refills: 1 | Status: SHIPPED | OUTPATIENT
Start: 2025-07-22